# Patient Record
Sex: FEMALE | Race: WHITE | NOT HISPANIC OR LATINO | URBAN - METROPOLITAN AREA
[De-identification: names, ages, dates, MRNs, and addresses within clinical notes are randomized per-mention and may not be internally consistent; named-entity substitution may affect disease eponyms.]

---

## 2021-02-17 ENCOUNTER — OFFICE VISIT (OUTPATIENT)
Dept: URBAN - METROPOLITAN AREA CLINIC 19 | Facility: CLINIC | Age: 31
End: 2021-02-17
Payer: COMMERCIAL

## 2021-02-17 ENCOUNTER — WEB ENCOUNTER (OUTPATIENT)
Dept: URBAN - METROPOLITAN AREA CLINIC 19 | Facility: CLINIC | Age: 31
End: 2021-02-17

## 2021-02-17 DIAGNOSIS — K70.10 ALCOHOLIC HEPATITIS WITHOUT ASCITES: ICD-10-CM

## 2021-02-17 DIAGNOSIS — R79.89 ELEVATED LFTS: ICD-10-CM

## 2021-02-17 PROBLEM — 235875008: Status: ACTIVE | Noted: 2021-02-17

## 2021-02-17 PROCEDURE — G8427 DOCREV CUR MEDS BY ELIG CLIN: HCPCS | Performed by: INTERNAL MEDICINE

## 2021-02-17 PROCEDURE — 1036F TOBACCO NON-USER: CPT | Performed by: INTERNAL MEDICINE

## 2021-02-17 PROCEDURE — G8482 FLU IMMUNIZE ORDER/ADMIN: HCPCS | Performed by: INTERNAL MEDICINE

## 2021-02-17 PROCEDURE — 99204 OFFICE O/P NEW MOD 45 MIN: CPT | Performed by: INTERNAL MEDICINE

## 2021-02-17 RX ORDER — ALPRAZOLAM 0.5 MG/1
1 TABLET TABLET ORAL TWICE A DAY
Status: ACTIVE | COMMUNITY

## 2021-02-17 NOTE — HPI-OTHER HISTORIES
Mrs. Acosta is a 30 year old female who presents to GI clinic for alcohol hepatitis and abdominal pain.   She reports her alcohol intake increased at start of COVID pandemic. She reports when she was admitted to [facility] she was drinking 3 bottles of wine a day. She was admitted 1/5/2021 and was discharge 1/7/2021.   She reports when withdrawing from alcohol she can gets sweats but no seizures or confusion.   Records from [facility] were reviewed today and showed HgB is 12.5, , INR 1.1, , ALT 82, Alk phos 70, T bili 1.94.  She reports having alcohol last 2 days ago.   She reports having gastric bypass in 2011.

## 2021-02-18 LAB
A/G RATIO: 1.4
ALBUMIN: 4.1
ALKALINE PHOSPHATASE: 85
ALT (SGPT): 67
AST (SGOT): 53
BILIRUBIN, TOTAL: 0.7
BUN/CREATININE RATIO: 13
BUN: 8
CALCIUM: 9.7
CARBON DIOXIDE, TOTAL: 24
CHLORIDE: 99
CREATININE: 0.64
EGFR IF AFRICN AM: 140
EGFR IF NONAFRICN AM: 122
GLOBULIN, TOTAL: 3
GLUCOSE: 89
HEMATOCRIT: 41.2
HEMOGLOBIN: 13.3
INR: 1
MCH: 32
MCHC: 32.3
MCV: 99
NRBC: (no result)
PLATELETS: 221
POTASSIUM: 3.8
PROTEIN, TOTAL: 7.1
PROTHROMBIN TIME: 10.8
RBC: 4.16
RDW: 12.3
SODIUM: 137
WBC: 5.1

## 2021-04-01 ENCOUNTER — OFFICE VISIT (OUTPATIENT)
Dept: URBAN - METROPOLITAN AREA CLINIC 19 | Facility: CLINIC | Age: 31
End: 2021-04-01

## 2021-04-09 ENCOUNTER — DASHBOARD ENCOUNTERS (OUTPATIENT)
Age: 31
End: 2021-04-09

## 2021-05-11 ENCOUNTER — OFFICE VISIT (OUTPATIENT)
Dept: RURAL CLINIC 4 | Facility: CLINIC | Age: 31
End: 2021-05-11

## 2021-05-11 RX ORDER — ALPRAZOLAM 0.5 MG/1
1 TABLET TABLET ORAL TWICE A DAY
COMMUNITY